# Patient Record
Sex: FEMALE | Race: WHITE | Employment: UNEMPLOYED | URBAN - METROPOLITAN AREA
[De-identification: names, ages, dates, MRNs, and addresses within clinical notes are randomized per-mention and may not be internally consistent; named-entity substitution may affect disease eponyms.]

---
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
## 2022-04-23 DIAGNOSIS — N94.6 DYSMENORRHEA: ICD-10-CM

## 2022-04-25 RX ORDER — DROSPIRENONE AND ETHINYL ESTRADIOL 0.03MG-3MG
KIT ORAL
Qty: 84 TABLET | Refills: 0 | Status: SHIPPED | OUTPATIENT
Start: 2022-04-25 | End: 2022-06-06

## 2022-04-26 NOTE — TELEPHONE ENCOUNTER
Medication is being filled for 1 time refill only due to:  Patient needs to be seen because it has been more than one year since last visit.     Dolores Pickett RN

## 2022-06-26 ENCOUNTER — HEALTH MAINTENANCE LETTER (OUTPATIENT)
Age: 22
End: 2022-06-26

## 2022-06-28 ENCOUNTER — OFFICE VISIT (OUTPATIENT)
Dept: FAMILY MEDICINE | Facility: CLINIC | Age: 22
End: 2022-06-28
Payer: COMMERCIAL

## 2022-06-28 VITALS
DIASTOLIC BLOOD PRESSURE: 84 MMHG | WEIGHT: 120 LBS | HEART RATE: 94 BPM | RESPIRATION RATE: 18 BRPM | BODY MASS INDEX: 18.83 KG/M2 | HEIGHT: 67 IN | TEMPERATURE: 98.4 F | SYSTOLIC BLOOD PRESSURE: 118 MMHG | OXYGEN SATURATION: 98 %

## 2022-06-28 DIAGNOSIS — J06.9 VIRAL URI: Primary | ICD-10-CM

## 2022-06-28 DIAGNOSIS — Z11.52 ENCOUNTER FOR SCREENING LABORATORY TESTING FOR COVID-19 VIRUS: ICD-10-CM

## 2022-06-28 DIAGNOSIS — R05.9 COUGH: ICD-10-CM

## 2022-06-28 DIAGNOSIS — J02.9 SORE THROAT: ICD-10-CM

## 2022-06-28 DIAGNOSIS — T50.905A ADVERSE EFFECT OF DRUG, INITIAL ENCOUNTER: ICD-10-CM

## 2022-06-28 LAB
DEPRECATED S PYO AG THROAT QL EIA: NEGATIVE
GROUP A STREP BY PCR: DETECTED

## 2022-06-28 PROCEDURE — 99214 OFFICE O/P EST MOD 30 MIN: CPT | Performed by: PHYSICIAN ASSISTANT

## 2022-06-28 PROCEDURE — U0005 INFEC AGEN DETEC AMPLI PROBE: HCPCS | Performed by: PHYSICIAN ASSISTANT

## 2022-06-28 PROCEDURE — 87651 STREP A DNA AMP PROBE: CPT | Performed by: PHYSICIAN ASSISTANT

## 2022-06-28 PROCEDURE — U0003 INFECTIOUS AGENT DETECTION BY NUCLEIC ACID (DNA OR RNA); SEVERE ACUTE RESPIRATORY SYNDROME CORONAVIRUS 2 (SARS-COV-2) (CORONAVIRUS DISEASE [COVID-19]), AMPLIFIED PROBE TECHNIQUE, MAKING USE OF HIGH THROUGHPUT TECHNOLOGIES AS DESCRIBED BY CMS-2020-01-R: HCPCS | Performed by: PHYSICIAN ASSISTANT

## 2022-06-28 ASSESSMENT — PAIN SCALES - GENERAL: PAINLEVEL: NO PAIN (0)

## 2022-06-28 NOTE — PROGRESS NOTES
Assessment & Plan     Cough  Viral URI  Encounter for screening laboratory testing for COVID-19 virus  Sore throat  Adverse effect of drug, initial encounter    Patient is a 21-year-old female who presents to clinic due to 3 days of fever, cough, congestion.  Patient took amoxicillin which was not prescribed to her and developed tingling and itching in hands and feet as well as a few hives on her back.  Per chart review, she had not taken this medication in the past.  Vital signs normal.  Physical exam significant for dry cough.  No visible urticaria, wheezing, or airway obstruction to suggest ongoing medication reaction.  Added amoxicillin to allergy list and discussed the importance of avoiding this medication in the future.  Symptoms are concerning for COVID-19, strep throat, or viral URI.  Will complete COVID-19 and strep testing.  Discussed OTC symptom management and return precautions.  - Symptomatic; Unknown COVID-19 Virus (Coronavirus) by PCR Nose  - Streptococcus A Rapid Scr w Reflx to PCR - Lab Collect; Future      See Patient Instructions    Return in about 1 week (around 7/5/2022), or if symptoms worsen or fail to improve.    Luda Goins PA-C  [facility] ARIELLA Carrillo is a 21 year old, presenting for the following health issues:  Rash      HPI     Acute Illness  Acute illness concerns:  Patient lives in [place] and flew back to [place] 3 days ago. She then developed fever, cough, and congestion. Pain, burning and pins and needles feelings on hands and feet and possible hives on back. Prior to this patient took Amoxicillin. Zyrtec helped. Home COVID19 testing completed yesterday and negative.  No history of hives.   Onset/Duration: 1 day  Symptoms:  Fever: YES- 101.3 on Sunday and Monday  Chills/Sweats: YES  Headache (location?): no  Sinus Pressure: no  Conjunctivitis:  no  Ear Pain: no  Rhinorrhea: no  Congestion: YES  Sore Throat: no  Cough: YES-productive of yellow  "sputum  Wheeze: no  Decreased Appetite: no  Nausea: no  Vomiting: no  Diarrhea: no  Dysuria/Freq.: no  Dysuria or Hematuria: no  Fatigue/Achiness: YES  Sick/Strep Exposure: no  Therapies tried and outcome: Ibuprofen, amoxicillin, OTC anti itch cream    Chart review completed.  No historical use of penicillins.        Objective    /84   Pulse 94   Temp 98.4  F (36.9  C) (Tympanic)   Resp 18   Ht 1.702 m (5' 7\")   Wt 54.4 kg (120 lb)   LMP  (LMP Unknown)   SpO2 98%   Breastfeeding No   BMI 18.79 kg/m    Body mass index is 18.79 kg/m .  Physical Exam  Vitals and nursing note reviewed.   Constitutional:       General: She is not in acute distress.     Appearance: Normal appearance.   HENT:      Head: Normocephalic and atraumatic.      Mouth/Throat:      Mouth: Mucous membranes are moist.      Pharynx: Oropharynx is clear.      Comments: Bilateral mild-moderate tonsillar swelling with exudate and erythema.  No airway obstruction.  Eyes:      Extraocular Movements: Extraocular movements intact.      Pupils: Pupils are equal, round, and reactive to light.   Cardiovascular:      Rate and Rhythm: Normal rate and regular rhythm.      Heart sounds: Normal heart sounds.   Pulmonary:      Effort: Pulmonary effort is normal. No respiratory distress.      Breath sounds: Normal breath sounds. No wheezing, rhonchi or rales.      Comments: Intermittent dry cough  Musculoskeletal:         General: Normal range of motion.      Cervical back: Normal range of motion.   Lymphadenopathy:      Cervical: No cervical adenopathy.   Skin:     General: Skin is warm and dry.      Findings: No rash.   Neurological:      General: No focal deficit present.      Mental Status: She is alert.   Psychiatric:         Mood and Affect: Mood normal.         Behavior: Behavior normal.                          .  ..    "

## 2022-06-28 NOTE — PATIENT INSTRUCTIONS
The itching and small areas of possible hives are potentially due to amoxicillin.  I have added this to your allergy list.  Do not take this medication in the future.    Your cough, congestion, fever, and sore throat symptoms are concerning for strep throat , COVID-19, or other viral illness.  A COVID-19 and strep test have been completed.  After completing the test, results should be available within 1-2 days and will be sent to your MyChart.  We will send antibiotics at that time if needed for strep.  You may use Tylenol/ibuprofen for fever and pain management and Robitussin-DM/Mucinex DM for cough and congestion.  Make sure to get plenty of rest and stay hydrated.      If you have severe symptoms such as chest pain, wheezing, coughing up blood, shortness of breath, or fevers that you cannot control, please go to the emergency department.    Please reach out with any questions or concerns.  Take care,  Luda Goins PA-C

## 2022-06-29 DIAGNOSIS — J02.0 STREPTOCOCCAL PHARYNGITIS: Primary | ICD-10-CM

## 2022-06-29 LAB — SARS-COV-2 RNA RESP QL NAA+PROBE: NEGATIVE

## 2022-06-29 RX ORDER — CEPHALEXIN 500 MG/1
500 CAPSULE ORAL 2 TIMES DAILY
Qty: 20 CAPSULE | Refills: 0 | Status: SHIPPED | OUTPATIENT
Start: 2022-06-29 | End: 2022-07-09

## 2022-09-10 DIAGNOSIS — N94.6 DYSMENORRHEA: ICD-10-CM

## 2022-09-12 RX ORDER — DROSPIRENONE AND ETHINYL ESTRADIOL 0.03MG-3MG
KIT ORAL
Qty: 28 TABLET | Refills: 0 | Status: SHIPPED | OUTPATIENT
Start: 2022-09-12 | End: 2022-10-07

## 2022-11-20 ENCOUNTER — HEALTH MAINTENANCE LETTER (OUTPATIENT)
Age: 22
End: 2022-11-20

## 2022-12-15 ENCOUNTER — MYC REFILL (OUTPATIENT)
Dept: FAMILY MEDICINE | Facility: CLINIC | Age: 22
End: 2022-12-15

## 2022-12-15 DIAGNOSIS — N94.6 DYSMENORRHEA: ICD-10-CM

## 2022-12-16 RX ORDER — DROSPIRENONE AND ETHINYL ESTRADIOL 0.03MG-3MG
1 KIT ORAL DAILY
Qty: 28 TABLET | Refills: 0 | Status: SHIPPED | OUTPATIENT
Start: 2022-12-16 | End: 2023-02-21

## 2023-02-21 ENCOUNTER — MYC REFILL (OUTPATIENT)
Dept: FAMILY MEDICINE | Facility: CLINIC | Age: 23
End: 2023-02-21
Payer: COMMERCIAL

## 2023-02-21 DIAGNOSIS — N94.6 DYSMENORRHEA: ICD-10-CM

## 2023-02-21 NOTE — TELEPHONE ENCOUNTER
Patient is scheduled for an OV on 3/20/23 for an annual .  She stated that she has been on her birth cocontrol pills for about 4-5 years, an would like enough to get her until her appointment.    She would like a call back either way.    Last OV 6/28/22 with Luda Goins for a URI  Last OV for med was on 5/26/21 with Hannah Arredondo RN BSN  Triage Nurse  [facility]: [facility]  Ph: [phone]

## 2023-02-22 RX ORDER — DROSPIRENONE AND ETHINYL ESTRADIOL 0.03MG-3MG
1 KIT ORAL DAILY
Qty: 28 TABLET | Refills: 0 | Status: SHIPPED | OUTPATIENT
Start: 2023-02-22

## 2023-07-08 ENCOUNTER — HEALTH MAINTENANCE LETTER (OUTPATIENT)
Age: 23
End: 2023-07-08

## 2024-08-31 ENCOUNTER — HEALTH MAINTENANCE LETTER (OUTPATIENT)
Age: 24
End: 2024-08-31